# Patient Record
Sex: FEMALE | Race: BLACK OR AFRICAN AMERICAN | NOT HISPANIC OR LATINO | Employment: PART TIME | URBAN - METROPOLITAN AREA
[De-identification: names, ages, dates, MRNs, and addresses within clinical notes are randomized per-mention and may not be internally consistent; named-entity substitution may affect disease eponyms.]

---

## 2019-10-27 ENCOUNTER — HOSPITAL ENCOUNTER (EMERGENCY)
Facility: HOSPITAL | Age: 19
Discharge: HOME | End: 2019-10-27
Attending: EMERGENCY MEDICINE
Payer: MEDICAID

## 2019-10-27 VITALS
BODY MASS INDEX: 31.36 KG/M2 | HEIGHT: 63 IN | OXYGEN SATURATION: 99 % | TEMPERATURE: 99 F | SYSTOLIC BLOOD PRESSURE: 120 MMHG | DIASTOLIC BLOOD PRESSURE: 74 MMHG | HEART RATE: 98 BPM | RESPIRATION RATE: 18 BRPM | WEIGHT: 177 LBS

## 2019-10-27 DIAGNOSIS — Z87.2 HISTORY OF URTICARIA: Primary | ICD-10-CM

## 2019-10-27 LAB
B-HCG UR QL: NEGATIVE
CTP QC/QA: YES

## 2019-10-27 PROCEDURE — 96372 THER/PROPH/DIAG INJ SC/IM: CPT | Mod: ER

## 2019-10-27 PROCEDURE — 81025 URINE PREGNANCY TEST: CPT | Mod: ER | Performed by: EMERGENCY MEDICINE

## 2019-10-27 PROCEDURE — 63600175 PHARM REV CODE 636 W HCPCS: Mod: ER | Performed by: EMERGENCY MEDICINE

## 2019-10-27 PROCEDURE — 99284 EMERGENCY DEPT VISIT MOD MDM: CPT | Mod: 25,ER

## 2019-10-27 RX ORDER — EPINEPHRINE 0.3 MG/.3ML
1 INJECTION SUBCUTANEOUS
Qty: 1 DEVICE | Refills: 0 | OUTPATIENT
Start: 2019-10-27 | End: 2019-12-03

## 2019-10-27 RX ORDER — DEXAMETHASONE SODIUM PHOSPHATE 4 MG/ML
4 INJECTION, SOLUTION INTRA-ARTICULAR; INTRALESIONAL; INTRAMUSCULAR; INTRAVENOUS; SOFT TISSUE
Status: COMPLETED | OUTPATIENT
Start: 2019-10-27 | End: 2019-10-27

## 2019-10-27 RX ADMIN — DEXAMETHASONE SODIUM PHOSPHATE 4 MG: 4 INJECTION, SOLUTION INTRA-ARTICULAR; INTRALESIONAL; INTRAMUSCULAR; INTRAVENOUS; SOFT TISSUE at 01:10

## 2019-10-27 NOTE — ED PROVIDER NOTES
"Encounter Date: 10/27/2019    SCRIBE #1 NOTE: I, Wendy Villar, am scribing for, and in the presence of,  Dr. Castro. I have scribed the following portions of the note - Other sections scribed: HPI, ROS, PE.       History     Chief Complaint   Patient presents with    Urticaria     Patient with complaints of hives off and on for 2 months   Happened yesterday and she took benadryl at 5 pm and throat felt tight and symptoms resolved 2 hours later     Shweta Biggs is a 19 y.o. female with history of anxiety who presents to the ED complaining of "hives" intermittent for 2 months; resolved now. Patient report that she ate something 1 day and began to break out every day for 2 months. Patient states that she even breaks out without eating. She reports that sometimes she is SOB and has trouble swallowing. Relief with benadryl. No recent changes to shampoo, soap, or any other hygiene products. No medication changes. Denies wheezing. Mother reports that the patient had an allergy test done when she was younger. Patient is allergic to milk containing products and wheat containing products.  No complaints currently    The history is provided by the patient and a parent. No  was used.     Review of patient's allergies indicates:   Allergen Reactions    Milk containing products      Hives      Wheat containing prod      Past Medical History:   Diagnosis Date    Allergies      History reviewed. No pertinent surgical history.  History reviewed. No pertinent family history.  Social History     Tobacco Use    Smoking status: Never Smoker    Smokeless tobacco: Never Used   Substance Use Topics    Alcohol use: Never     Frequency: Never    Drug use: Never     Review of Systems   HENT: Negative for trouble swallowing (resolved).    Respiratory: Negative for shortness of breath (resolved) and wheezing.    Skin: Positive for rash (Hives, resolved).   Allergic/Immunologic: Positive for food allergies. " Problem: Risk for Maternal Complications  Goal: Remains free of signs and symptoms of infection    Intervention: Limit vaginal examinations  Done and ongoing    Goal: Remains free from falls    Intervention: Provide and maintain a safe environment  Done and ongoing "      Physical Exam     Initial Vitals [10/27/19 1236]   BP Pulse Resp Temp SpO2   119/73 88 20 98.7 °F (37.1 °C) 100 %      MAP       --         Physical Exam    Nursing note and vitals reviewed.  Constitutional: She appears well-developed and well-nourished.   HENT:   Head: Normocephalic and atraumatic.   Mouth/Throat: No oropharyngeal exudate.   Eyes: Conjunctivae are normal.   Neck: Normal range of motion. Neck supple.   Cardiovascular: Normal rate, regular rhythm, normal heart sounds and intact distal pulses. Exam reveals no gallop and no friction rub.    No murmur heard.  Pulmonary/Chest: Effort normal and breath sounds normal. No respiratory distress. She has no wheezes. She has no rhonchi. She has no rales.   Musculoskeletal: Normal range of motion.   Neurological: She is alert and oriented to person, place, and time.   Skin: Skin is warm and dry. No rash noted. No erythema.   Psychiatric: She has a normal mood and affect.         ED Course   Procedures  Labs Reviewed   POCT URINE PREGNANCY          Imaging Results    None          Medical Decision Making:   History:   Old Medical Records: I decided to obtain old medical records.  Initial Assessment:   This is a 19 y.o. female who presents to the ED complaining of "hives", SOB, and trouble swallowing.  No complaints currently.  She did show a picture of the hives  Patient denies wheezing.    No significant findings found during the physical exam.  ED Management:  Patient has no complaints at this time.  She will be given a Decadron shot here so hopefully her symptoms will resolve at home.  She was also advised to take Benadryl every 4-6 hours for the next 2 days.  She will be referred to an allergist.  Patient was prescribed an EpiPen and given specific reasons to use it.            Scribe Attestation:   Scribe #1: I performed the above scribed service and the documentation accurately describes the services I performed. I attest to the accuracy of the " note.       I, Dr. Leidy Castro, personally performed the services described in this documentation. All medical record entries made by the scribe were at my direction and in my presence.  I have reviewed the chart and agree that the record reflects my personal performance and is accurate and complete. Leidy Castro MD.  2:47 PM 10/27/2019             Clinical Impression:     1. History of urticaria                                   Leidy Castro MD  10/27/19 8023

## 2019-10-27 NOTE — DISCHARGE INSTRUCTIONS
Take Benadryl every 4-6 hours for the next 2 days.  Follow up with the allergist soon as possible.  Return here is needed.  Use the EpiPen as needed

## 2019-12-03 ENCOUNTER — HOSPITAL ENCOUNTER (EMERGENCY)
Facility: HOSPITAL | Age: 19
Discharge: HOME | End: 2019-12-03
Attending: EMERGENCY MEDICINE
Payer: MEDICAID

## 2019-12-03 VITALS
DIASTOLIC BLOOD PRESSURE: 61 MMHG | RESPIRATION RATE: 16 BRPM | SYSTOLIC BLOOD PRESSURE: 121 MMHG | BODY MASS INDEX: 32.5 KG/M2 | WEIGHT: 183.44 LBS | OXYGEN SATURATION: 100 % | HEART RATE: 74 BPM | HEIGHT: 63 IN | TEMPERATURE: 99 F

## 2019-12-03 DIAGNOSIS — T78.2XXA ANAPHYLAXIS, INITIAL ENCOUNTER: Primary | ICD-10-CM

## 2019-12-03 PROCEDURE — 63600175 PHARM REV CODE 636 W HCPCS: Performed by: EMERGENCY MEDICINE

## 2019-12-03 PROCEDURE — 99284 EMERGENCY DEPT VISIT MOD MDM: CPT | Mod: 25

## 2019-12-03 PROCEDURE — 96374 THER/PROPH/DIAG INJ IV PUSH: CPT

## 2019-12-03 PROCEDURE — 99284 EMERGENCY DEPT VISIT MOD MDM: CPT | Mod: ,,, | Performed by: EMERGENCY MEDICINE

## 2019-12-03 PROCEDURE — 96375 TX/PRO/DX INJ NEW DRUG ADDON: CPT | Mod: 59

## 2019-12-03 PROCEDURE — 99284 PR EMERGENCY DEPT VISIT,LEVEL IV: ICD-10-PCS | Mod: ,,, | Performed by: EMERGENCY MEDICINE

## 2019-12-03 PROCEDURE — 96372 THER/PROPH/DIAG INJ SC/IM: CPT

## 2019-12-03 PROCEDURE — S0028 INJECTION, FAMOTIDINE, 20 MG: HCPCS | Performed by: EMERGENCY MEDICINE

## 2019-12-03 PROCEDURE — 25000003 PHARM REV CODE 250: Performed by: EMERGENCY MEDICINE

## 2019-12-03 RX ORDER — PREDNISONE 20 MG/1
60 TABLET ORAL DAILY
Qty: 15 TABLET | Refills: 0 | Status: SHIPPED | OUTPATIENT
Start: 2019-12-03 | End: 2019-12-08

## 2019-12-03 RX ORDER — METHYLPREDNISOLONE SOD SUCC 125 MG
125 VIAL (EA) INJECTION
Status: COMPLETED | OUTPATIENT
Start: 2019-12-03 | End: 2019-12-03

## 2019-12-03 RX ORDER — EPINEPHRINE 0.3 MG/.3ML
0.3 INJECTION SUBCUTANEOUS
Status: COMPLETED | OUTPATIENT
Start: 2019-12-03 | End: 2019-12-03

## 2019-12-03 RX ORDER — FAMOTIDINE 10 MG/ML
20 INJECTION INTRAVENOUS
Status: COMPLETED | OUTPATIENT
Start: 2019-12-03 | End: 2019-12-03

## 2019-12-03 RX ORDER — EPINEPHRINE 0.3 MG/.3ML
1 INJECTION SUBCUTANEOUS
Qty: 2 DEVICE | Refills: 0 | Status: SHIPPED | OUTPATIENT
Start: 2019-12-03 | End: 2019-12-03 | Stop reason: SDUPTHER

## 2019-12-03 RX ORDER — PREDNISONE 20 MG/1
60 TABLET ORAL DAILY
Qty: 15 TABLET | Refills: 0 | Status: SHIPPED | OUTPATIENT
Start: 2019-12-03 | End: 2019-12-03 | Stop reason: SDUPTHER

## 2019-12-03 RX ORDER — EPINEPHRINE 0.3 MG/.3ML
1 INJECTION SUBCUTANEOUS
Qty: 2 DEVICE | Refills: 0 | Status: SHIPPED | OUTPATIENT
Start: 2019-12-03 | End: 2020-12-02

## 2019-12-03 RX ADMIN — METHYLPREDNISOLONE SODIUM SUCCINATE 125 MG: 125 INJECTION, POWDER, FOR SOLUTION INTRAMUSCULAR; INTRAVENOUS at 02:12

## 2019-12-03 RX ADMIN — FAMOTIDINE 20 MG: 10 INJECTION, SOLUTION INTRAVENOUS at 02:12

## 2019-12-03 RX ADMIN — EPINEPHRINE 0.3 MG: 0.3 INJECTION INTRAMUSCULAR at 02:12

## 2019-12-03 NOTE — ED NOTES
Pt resting in bed. AAox3. No distress noted. Respirations even and unlabored. On continuous cardiac monitor, SR noted. Denies any current complaints. Side rails up x2. Call light within reach. Updated on POC. Will continue to monitor.

## 2019-12-03 NOTE — ED TRIAGE NOTES
Shweta Biggs, a 19 y.o. female presents to the ED via personal transportation with CC lip and throat swelling onset last night, worse this AM, took 3 - 25g benadryls PTA. States this has happened before and needed an epipen. Denies any medication allergies. Denies any new foods.    Patient identifiers verified verbally with patient and correct for Shweta Biggs.    LOC/ APPEARANCE: The patient is AAOx4. Pt is speaking appropriately, no slurred speech. Pt changed into hospital gown. Continuous cardiac monitor, cont pulse ox, and auto BP cuff applied to patient. Pt is clean and well groomed. No JVD visible. Pt reports pain level of 0. Pt updated on POC. Bed low and locked with side rails up x2, call bell in pt reach. Family at bedside.  SKIN: Skin is warm dry and intact, and color is consistent with ethnicity. Capillary refill <3 seconds. No breakdown or brusing visible. Mucus membranes moist, acyanotic. Severe swelling noted to patient lower lip, no swelling noted to tongue or throat at this time, denies pain or difficulty with breathing or swallowing.  RESPIRATORY: Airway is open and patent. Respirations-spontaneous, unlabored, regular rate, equal bilaterally on inspiration and expiration. No accessory muscle use noted. Lungs clear to auscultation in all fields bilaterally anterior and posterior.   CARDIAC: Patient has regular heart rate. No peripheral edema noted, and patient has no c/o chest pain. Peripheral pulses present equal and strong throughout.  ABDOMEN: Soft and non-tender to palpation with no distention noted. Normoactive bowel sounds x4 quadrants. Pt has no complaints of abnormal bowel movements, denies blood in stool. Pt reports normal appetite.   NEUROLOGIC: Eyes open spontaneously and facial expression symmetrical. Pt behavior appropriate to situation, and pt follows commands. Pt reports sensation present in all extremities when touched with a finger, denies any numbness or tingling.  PERRLA  MUSCULOSKELETAL: Spontaneous movement noted to all extremities. Hand  equal and leg strength strong +5 bilaterally.

## 2019-12-04 NOTE — ED PROVIDER NOTES
Encounter Date: 12/3/2019       History     Chief Complaint   Patient presents with    Facial Swelling     lower lip swollen, throat not scratchy after 3 benadryl, seen at [facility] month ago had epi pen, steroid shot    Allergic Reaction     18yo female with hx of previous allergic reactions treated with prednisone who presents with sudden onset of urticarial rash last p.m. which became progressively worse throughout the night and into the morning.  Approximately 11:00 a.m. symptoms included lower lip swelling and difficulty swallowing.  At this point the patient took Benadryl but when her symptoms do not resolve she came to the emergency department.  At this time she denies trouble breathing, throat swelling resolved, no tongue swelling or difficulty speaking, no difficulty swallowing, no shortness of breath, no chest pain, no abdominal pain. Patient complaining of swollen lip.  Patient reports that they have not discovered what is causing her allergic reactions.  Patient reports that she does shellfish, however she does get this rash on days when she has not ingested any seafood.  Patient is a vegetarian        Review of patient's allergies indicates:   Allergen Reactions    Milk containing products      Hives      Wheat containing prod      Past Medical History:   Diagnosis Date    Allergies      History reviewed. No pertinent surgical history.  History reviewed. No pertinent family history.  Social History     Tobacco Use    Smoking status: Never Smoker    Smokeless tobacco: Never Used   Substance Use Topics    Alcohol use: Yes     Frequency: Never     Comment: sometimes    Drug use: Never     Review of Systems   Constitutional: Negative for chills and fever.   HENT: Negative for congestion, dental problem, drooling, mouth sores, rhinorrhea, sinus pressure, sinus pain and trouble swallowing.    Respiratory: Negative for chest tightness, shortness of breath and wheezing.    Cardiovascular: Negative for  chest pain and leg swelling.   Gastrointestinal: Negative for abdominal pain, nausea and vomiting.   All other systems reviewed and are negative.      Physical Exam     Initial Vitals [12/03/19 1409]   BP Pulse Resp Temp SpO2   136/68 80 18 99.1 °F (37.3 °C) 100 %      MAP       --         Physical Exam    Nursing note and vitals reviewed.  Constitutional: She appears well-developed and well-nourished. She is not diaphoretic. No distress.   HENT:   Head: Normocephalic and atraumatic.   Nose: Nose normal.   Mouth/Throat: Uvula is midline and oropharynx is clear and moist. No uvula swelling. No oropharyngeal exudate, posterior oropharyngeal edema or posterior oropharyngeal erythema.   Edematous lower lip   Eyes: Conjunctivae and EOM are normal. Pupils are equal, round, and reactive to light.   Neck: Normal range of motion. Neck supple. No JVD present.   Cardiovascular: Normal rate, regular rhythm, normal heart sounds and intact distal pulses. Exam reveals no gallop and no friction rub.    No murmur heard.  Pulmonary/Chest: Breath sounds normal. No respiratory distress. She has no wheezes. She has no rhonchi. She has no rales. She exhibits no tenderness.   Abdominal: Soft. Bowel sounds are normal. She exhibits no distension and no mass. There is no tenderness. There is no rebound and no guarding.   Musculoskeletal: Normal range of motion. She exhibits no edema.   Neurological: She is alert and oriented to person, place, and time. She has normal strength. No cranial nerve deficit.   Skin: Skin is warm. Capillary refill takes less than 2 seconds. No erythema.   Psychiatric: She has a normal mood and affect. Thought content normal.         ED Course   Procedures  Labs Reviewed - No data to display       Imaging Results    None          Medical Decision Making:   History:   Old Medical Records: I decided to obtain old medical records.  Differential Diagnosis:   Allergic reaction, anaphylaxis, C1 esterase deficiency, lupus,  idiopathic urticaria  ED Management:  Mother concerned because patient's cousin has been having similar symptoms and was subsequently admitted in diagnosed with lupus.  Discussed with mother that I did not believe we could get EVERETTE back in the emergency department, but would find out.                    ED Course as of Dec 03 1932   Tue Dec 03, 2019   1710 Lip swelling has significantly improved, but is still present.  Inform mom that lupus testing is done Fridays and Mondays, defer to her PCP.    [MA]   1810 Patient's symptoms have resolved.  Once again educated the patient on how to and when to use her EpiPen.  I also discussed with patient that she needs to avoid shellfish until the cause of her symptoms can be further evaluated.    [MA]      ED Course User Index  [MA] Ray Soler MD                Clinical Impression:       ICD-10-CM ICD-9-CM   1. Anaphylaxis, initial encounter T78.2XXA 995.0                             Ray Soler MD  12/03/19 1933

## 2019-12-04 NOTE — ED NOTES
Discharge instructions and meds reviewed with patient, verbalized understanding. No distress noted. AAOx3. Respirations even and unlabored. No current complaints voiced.  20G IV removed from R AC, dressing placed over site.

## 2020-03-16 ENCOUNTER — TELEPHONE (OUTPATIENT)
Dept: OBSTETRICS AND GYNECOLOGY | Facility: CLINIC | Age: 20
End: 2020-03-16

## 2020-03-16 NOTE — TELEPHONE ENCOUNTER
Called patient to let her know that her appointment will be canceled. Left voicemail for her to give the office a call back if needed.

## 2021-09-18 ENCOUNTER — HOSPITAL ENCOUNTER (EMERGENCY)
Facility: HOSPITAL | Age: 21
Discharge: LEFT BEFORE DISCHARGE | End: 2021-09-19
Payer: MEDICAID

## 2021-09-18 VITALS
OXYGEN SATURATION: 98 % | TEMPERATURE: 99 F | HEIGHT: 63 IN | SYSTOLIC BLOOD PRESSURE: 130 MMHG | DIASTOLIC BLOOD PRESSURE: 75 MMHG | WEIGHT: 183 LBS | HEART RATE: 99 BPM | RESPIRATION RATE: 18 BRPM | BODY MASS INDEX: 32.43 KG/M2

## 2021-09-18 PROCEDURE — 99900041 HC LEFT WITHOUT BEING SEEN- EMERGENCY

## 2021-09-19 LAB
B-HCG UR QL: NEGATIVE
CTP QC/QA: YES

## 2021-09-19 PROCEDURE — 81025 URINE PREGNANCY TEST: CPT | Performed by: EMERGENCY MEDICINE

## 2022-12-14 ENCOUNTER — HOSPITAL ENCOUNTER (EMERGENCY)
Facility: HOSPITAL | Age: 22
Discharge: HOME | End: 2022-12-15
Attending: EMERGENCY MEDICINE
Payer: MEDICAID

## 2022-12-14 DIAGNOSIS — R06.02 SOB (SHORTNESS OF BREATH): ICD-10-CM

## 2022-12-14 DIAGNOSIS — E87.6 ACUTE HYPOKALEMIA: Primary | ICD-10-CM

## 2022-12-14 DIAGNOSIS — R00.0 TACHYCARDIA: ICD-10-CM

## 2022-12-14 LAB
BUN SERPL-MCNC: 9 MG/DL (ref 6–30)
CHLORIDE SERPL-SCNC: 102 MMOL/L (ref 95–110)
CREAT SERPL-MCNC: 0.7 MG/DL (ref 0.5–1.4)
GLUCOSE SERPL-MCNC: 119 MG/DL (ref 70–110)
HCT VFR BLD CALC: 31 %PCV (ref 36–54)
POC IONIZED CALCIUM: 1.2 MMOL/L (ref 1.06–1.42)
POC TCO2 (MEASURED): 22 MMOL/L (ref 23–29)
POTASSIUM BLD-SCNC: 3.1 MMOL/L (ref 3.5–5.1)
SAMPLE: ABNORMAL
SODIUM BLD-SCNC: 139 MMOL/L (ref 136–145)

## 2022-12-14 PROCEDURE — 93005 ELECTROCARDIOGRAM TRACING: CPT

## 2022-12-14 PROCEDURE — 93010 ELECTROCARDIOGRAM REPORT: CPT | Mod: ,,, | Performed by: INTERNAL MEDICINE

## 2022-12-14 PROCEDURE — 82565 ASSAY OF CREATININE: CPT

## 2022-12-14 PROCEDURE — 25000003 PHARM REV CODE 250: Performed by: EMERGENCY MEDICINE

## 2022-12-14 PROCEDURE — 99284 EMERGENCY DEPT VISIT MOD MDM: CPT | Mod: ,,, | Performed by: EMERGENCY MEDICINE

## 2022-12-14 PROCEDURE — 96360 HYDRATION IV INFUSION INIT: CPT

## 2022-12-14 PROCEDURE — 99284 PR EMERGENCY DEPT VISIT,LEVEL IV: ICD-10-PCS | Mod: ,,, | Performed by: EMERGENCY MEDICINE

## 2022-12-14 PROCEDURE — 99284 EMERGENCY DEPT VISIT MOD MDM: CPT | Mod: 25

## 2022-12-14 PROCEDURE — 82962 GLUCOSE BLOOD TEST: CPT

## 2022-12-14 PROCEDURE — 93010 EKG 12-LEAD: ICD-10-PCS | Mod: ,,, | Performed by: INTERNAL MEDICINE

## 2022-12-14 RX ORDER — DEXTROAMPHETAMINE SULFATE, DEXTROAMPHETAMINE SACCHARATE, AMPHETAMINE SULFATE AND AMPHETAMINE ASPARTATE 7.5; 7.5; 7.5; 7.5 MG/1; MG/1; MG/1; MG/1
30 CAPSULE, EXTENDED RELEASE ORAL EVERY MORNING
COMMUNITY
Start: 2022-10-03

## 2022-12-14 RX ADMIN — SODIUM CHLORIDE 1000 ML: 0.9 INJECTION, SOLUTION INTRAVENOUS at 11:12

## 2022-12-14 NOTE — Clinical Note
"Shweta"Aminta Biggs was seen and treated in our emergency department on 12/14/2022.  She may return to work on 12/16/2022.       If you have any questions or concerns, please don't hesitate to call.      Margo Smith MD"

## 2022-12-15 VITALS
WEIGHT: 172 LBS | OXYGEN SATURATION: 99 % | TEMPERATURE: 98 F | HEART RATE: 87 BPM | DIASTOLIC BLOOD PRESSURE: 64 MMHG | RESPIRATION RATE: 15 BRPM | SYSTOLIC BLOOD PRESSURE: 115 MMHG | BODY MASS INDEX: 30.47 KG/M2

## 2022-12-15 PROCEDURE — 25000003 PHARM REV CODE 250: Performed by: EMERGENCY MEDICINE

## 2022-12-15 RX ADMIN — POTASSIUM BICARBONATE 40 MEQ: 391 TABLET, EFFERVESCENT ORAL at 12:12

## 2022-12-15 NOTE — ED NOTES
I-STAT Chem-8+ Results:   Value Reference Range   Sodium 139 136-145 mmol/L   Potassium  3.1 3.5-5.1 mmol/L   Chloride 102  mmol/L   Ionized Calcium 1.20 1.06-1.42 mmol/L   CO2 (measured) 22 23-29 mmol/L   Glucose 119  mg/dL   BUN 9 6-30 mg/dL   Creatinine 0.7 0.5-1.4 mg/dL   Hematocrit 31 36-54%

## 2022-12-15 NOTE — ED PROVIDER NOTES
Encounter Date: 12/14/2022    SCRIBE #1 NOTE: I, Misty Moore, am scribing for, and in the presence of,  Margo Smith MD. I have scribed the following portions of the note - Other sections scribed: HPI, ROS.     History     Chief Complaint   Patient presents with    Shortness of Breath     Pt c/o SOB x1 day. Pt states it feels her heart is racing. Pulse ox 100% on RA in triage.     Time patient was seen by the provider: 10:49 PM      The patient is a 22 y.o. female with no significant past medical history presents to the ED with a complaint of shortness of breath starting today. She assumed her current symptoms were due to experiencing a panic attack so she came into the ED for further evaluation. The patient notes drinking 2 ensures today and has not eaten because she forgot to pack a lunch for work. She affirms weakness. No other exacerbating or alleviating factors. Patient denies all other associated symptoms.    The history is provided by the patient and medical records. No  was used.   Review of patient's allergies indicates:   Allergen Reactions    Milk containing products      Hives      Wheat containing prod      Past Medical History:   Diagnosis Date    Allergies      History reviewed. No pertinent surgical history.  History reviewed. No pertinent family history.  Social History     Tobacco Use    Smoking status: Some Days     Types: Vaping with nicotine    Smokeless tobacco: Never   Substance Use Topics    Alcohol use: Yes     Comment: sometimes    Drug use: Never     Review of Systems   Constitutional:  Positive for fatigue. Negative for chills, diaphoresis and fever.   HENT:  Negative for congestion, rhinorrhea and sore throat.    Eyes:  Negative for visual disturbance.   Respiratory:  Positive for shortness of breath. Negative for cough and chest tightness.    Cardiovascular:  Negative for chest pain.   Gastrointestinal:  Negative for abdominal pain, blood in stool,  constipation, diarrhea and vomiting.   Genitourinary:  Negative for dysuria, hematuria and urgency.   Musculoskeletal:  Negative for back pain.   Skin:  Negative for rash.   Neurological:  Negative for seizures and syncope.   Hematological:  Does not bruise/bleed easily.   Psychiatric/Behavioral:  Negative for agitation and hallucinations.      Physical Exam     Initial Vitals [12/14/22 2217]   BP Pulse Resp Temp SpO2   (!) 140/73 (!) 119 16 97.5 °F (36.4 °C) 100 %      MAP       --         Physical Exam  Gen: AxOx3, NAD, well nourished, appears stated age  Eye: EOMI, no scleral icterus, no periorbital edema or ecchymosis  Head: normocephalic, atraumatic, no lesions, scalp appears normal  ENT: neck supple, no stridor, no masses, no drooling or voice changes  CVS: regular tachycardia, no m/r/g, distal pulses intact/symmetric  Pulm: CTAB, no wheezes, rales or rhonchi, no increased work of breathing  Abd: soft, nontender, nondistended, no organomegaly, no CVAT  Ext: no edema, no lesions, rashes, or deformity  Neuro: GCS15, moving all extremities, gait intact, face grossly symmetric  Psych: normal affect, cooperative, well groomed, makes good eye contact    ED Course   Procedures  Labs Reviewed   ISTAT PROCEDURE - Abnormal; Notable for the following components:       Result Value    POC Glucose 119 (*)     POC Potassium 3.1 (*)     POC TCO2 (MEASURED) 22 (*)     POC Hematocrit 31 (*)     All other components within normal limits   ISTAT CHEM8          Imaging Results    None          Medications   sodium chloride 0.9% bolus 1,000 mL 1,000 mL (0 mLs Intravenous Stopped 12/15/22 0037)   potassium bicarbonate disintegrating tablet 40 mEq (40 mEq Oral Given 12/15/22 0043)     Medical Decision Making:   History:   Old Medical Records: I decided to obtain old medical records.  Initial Assessment:   This is a 22-year-old who presents with acute onset fatigue and tachycardia in the setting of only having had 2 ensures all day  today.  She is overall well appearing, we will administer some fluids and check an i-STAT, as I have overall low suspicion for cardiac arrhythmia, PE, sepsis, or other emergent presentation.  Clinical Tests:   Lab Tests: Reviewed and Ordered  ED Management:  Labs are notable for hypokalemia, and on reassessment the patient feels better after IV fluids, with improvement in her HR.  Plan for discharge home.        Scribe Attestation:   Scribe #1: I performed the above scribed service and the documentation accurately describes the services I performed. I attest to the accuracy of the note.      ED Course as of 12/15/22 0044   Wed Dec 14, 2022   2304 EKG by my independent interpretation is notable for sinus tachycardia rate of 115, with a nonspecific T-wave abnormality inferiorly and in V3, no priors available for comparison [EB]      ED Course User Index  [EB] Margo Smith MD                 Clinical Impression:   Final diagnoses:  [R00.0] Tachycardia  [R06.02] SOB (shortness of breath)  [E87.6] Acute hypokalemia (Primary)        ED Disposition Condition    Discharge Stable          ED Prescriptions    None       Follow-up Information       Follow up With Specialties Details Why Contact Info    [facility] - Emergency [facility] Emergency Medicine  If symptoms worsen [id number]  [facility] [id number] [phone]             Margo Smith MD  12/15/22 0044

## 2022-12-15 NOTE — ED NOTES
Patient identifiers verified and correct for Shweta Biggs.    LOC: The patient is awake, alert and oriented x 4. Pt is speaking appropriately, no slurred speech.  APPEARANCE: Patient resting comfortably and in no acute distress. Pt is clean and well groomed. No JVD visible. C/o generalized weakness   SKIN: Skin is warm dry and intact, and color is consistent with ethnicity.   MUSCULOSKELETAL: Full range of motion present in all extremities. Hand  equal and leg strength strong +2 bilaterally.  RESPIRATORY: Airway is open and patent. Respirations-unlabored, regular rate, equal bilaterally on inspiration and expiration.report intermittent SOB.  CARDIAC: c/o palpitations.  in triage  ABDOMEN: Soft and non-tender to palpation with no distention noted. Reports no oral intake today.   NEUROLOGIC: Eyes open spontaneously and facial expression symmetrical. Pt behavior appropriate to situation, and pt follows commands.  Pt reports sensation present in all extremities when touched with a finger. No s/s of ischemic stroke. PERRLA  : No complaints of frequency, burning, urgency or blood in the urine.

## 2022-12-15 NOTE — ED TRIAGE NOTES
Shweta Biggs, a 22 y.o. female presents to the ED w/ complaint of racing heart. Pt reports not having eaten or drank anything today. Reports having 2 ensures today. C/o weakness. Report not have taken prescribed adderall in one month due to shortage     Triage note:  Chief Complaint   Patient presents with    Shortness of Breath     Pt c/o SOB x1 day. Pt states it feels her heart is racing. Pulse ox 100% on RA in triage.     Review of patient's allergies indicates:   Allergen Reactions    Milk containing products      Hives      Wheat containing prod      Past Medical History:   Diagnosis Date    Allergies

## 2024-06-02 VITALS
TEMPERATURE: 98 F | OXYGEN SATURATION: 100 % | BODY MASS INDEX: 35.43 KG/M2 | SYSTOLIC BLOOD PRESSURE: 112 MMHG | DIASTOLIC BLOOD PRESSURE: 78 MMHG | RESPIRATION RATE: 16 BRPM | WEIGHT: 200 LBS | HEART RATE: 79 BPM

## 2024-06-02 PROCEDURE — 99281 EMR DPT VST MAYX REQ PHY/QHP: CPT

## 2024-06-03 ENCOUNTER — HOSPITAL ENCOUNTER (EMERGENCY)
Facility: HOSPITAL | Age: 24
Discharge: HOME | End: 2024-06-03
Attending: EMERGENCY MEDICINE

## 2024-06-03 DIAGNOSIS — J02.9 SORE THROAT: Primary | ICD-10-CM

## 2024-06-03 NOTE — DISCHARGE INSTRUCTIONS
As discussed, your symptoms likely represent a viral illness. Unfortunately there are no specific medicines to make the illness end faster. Antibiotics do not work for viral illnesses because they do not kill viruses.    Home Care Instructions:  - Take acetaminophen (Tylenol) or ibuprofen (Advil, Motrin) for with fevers, aches and pains   - Stay well hydrated and well rested  - Continue taking your home medications as prescribed    Other suggestions for symptomatic relief:              - Salt water gargles to soothe throat pain.              - Chloroseptic spray also helps to numb throat pain.              - Nasal saline spray reduces inflammation and dryness.              - Warm face compresses to help with facial sinus pain/pressure.              - Fuentes's vapor rub at night.              - Flonase OTC or Nasocort OTC for nasal congestion.              - Simple foods like bread, rice, soup.              - Delsym helps with coughing at night              - Zyrtec/Claritin during the day & Benadryl at night may help with allergies.    If you DO HAVE hypertension or palpitations, it is safe to take Coricidin HBP for relief of sinus symptoms.    If you DO NOT HAVE hypertension or any history of palpitations, it is ok to take over the counter Sudafed or Mucinex D or Allegra-D or Claritin-D or Zyrtec-D.  If you do take one of the above, it is ok to combine that with plain over the counter Mucinex or Allegra or Claritin or Zyrtec.   If, for example, you are taking Zyrtec-D, you can combine that with Mucinex, but not Mucinex-D.  If you are taking Mucinex-D, you can combine that with plain Allegra or Claritin or Zyrtec.     Follow-up plan:  - Follow-up with: Primary care doctor within 3 - 5 days  - Follow-up for additional testing and/or evaluation as directed by your primary doctor    Return to the Emergency Department for symptoms including but not limited to: worsening symptoms, severe headache, shortness of breath or  chest pain, worsening cough with thick yellow/green sputum, vomiting with inability to hold down fluids, fevers greater than 100.4°F, dizziness, passing out/fainting/unconsciousness, rash, symptoms persisting beyond 7 days, or other concerning symptoms.

## 2024-06-03 NOTE — ED PROVIDER NOTES
Source of History:  Patient  Chart    Chief complaint:  Sore Throat (X2 days. No meds taken at home. )      HPI:  Shweta Biggs is a 23 y.o. female with history of   Three days of nonproductive cough, sore throat, malaise.  Patient states that she was recently exposed to a sick contact, her friend who is a .  No measured fevers.  She has not taken anything for the sore throat.  Patient states she was concerned because when she breathes, she has pain in the back of her throat.   No actual difficulty breathing or difficulty swallowing.  No other complaints    Review of patient's allergies indicates:   Allergen Reactions    Milk containing products (dairy)      Hives      Wheat containing prod        No current facility-administered medications on file prior to encounter.     Current Outpatient Medications on File Prior to Encounter   Medication Sig Dispense Refill    ADDERALL XR 30 mg 24 hr capsule Take 30 mg by mouth every morning.      EPINEPHrine (EPIPEN) 0.3 mg/0.3 mL AtIn Inject 0.3 mLs (0.3 mg total) into the muscle as needed. 2 Device 0       PMH:  As per HPI and below:  Past Medical History:   Diagnosis Date    Allergies      No past surgical history on file.    Social History     Socioeconomic History    Marital status: Single   Tobacco Use    Smoking status: Some Days     Types: Vaping with nicotine    Smokeless tobacco: Never   Substance and Sexual Activity    Alcohol use: Yes     Comment: sometimes    Drug use: Never       No family history on file.    Physical Exam:      Vitals:    06/02/24 2342   BP: 112/78   Pulse: 79   Resp: 16   Temp: 98.3 °F (36.8 °C)     Gen: No acute distress. Nontoxic.  Mental Status:  Alert and oriented x 3.  Appropriate, conversant.  Skin: Warm, dry. No rashes seen.   Eyes: No conjunctival injection.  ENT:  No erythema or cobblestoning of the posterior oropharynx.  No petechiae.  No tonsillar hypertrophy or exudate.  No trismus.   Pulm: Clear to auscultation bilaterally.   Good air movement.No increased work of breathing.  CV: Normal peripheral perfusion.  MSK: No tender LAD. No neck swelling. Neck supple. No meningismus.   Neuro: Awake. Speech normal. No muffling. No focal neuro deficit observed.      Laboratory Studies:  Labs Reviewed   HIV 1 / 2 ANTIBODY   HEPATITIS C ANTIBODY     Chart reviewed.     Imaging Results    None         Medications Given:  Medications - No data to display      MDM:    23 y.o. female with  mild sore throat and nonproductive cough.  Afebrile and hemodynamically stable.  Well-appearing with reassuring exam.  Airway is patent.  Suspect URI.  We discussed supportive care measures.  Work note given.  Discharged home in stable condition        Diagnostic Impression:    1. Sore throat         ED Disposition Condition    Discharge Stable          ED Prescriptions    None       Follow-up Information       Follow up With Specialties Details Why Contact Info    your primary care doctor  Schedule an appointment as soon as possible for a visit               Patient understands the plan and is in agreement, verbalized understanding, questions answered    Nena Stevens MD  Emergency Medicine         Nena Stevens MD  06/03/24 0124

## 2024-06-03 NOTE — ED TRIAGE NOTES
Shweta Biggs, a 23 y.o. female presents to the ED w/ complaint of sore throat x2 days.     Triage note:  Chief Complaint   Patient presents with    Sore Throat     X2 days. No meds taken at home.      Review of patient's allergies indicates:   Allergen Reactions    Milk containing products (dairy)      Hives      Wheat containing prod      Past Medical History:   Diagnosis Date    Allergies

## 2024-06-03 NOTE — Clinical Note
"Shweta"Aminta Biggs was seen and treated in our emergency department on 6/2/2024.  She may return to work on 06/04/2024.       If you have any questions or concerns, please don't hesitate to call.      Nena Stevens MD"